# Patient Record
Sex: MALE | Race: WHITE | Employment: UNEMPLOYED | URBAN - METROPOLITAN AREA
[De-identification: names, ages, dates, MRNs, and addresses within clinical notes are randomized per-mention and may not be internally consistent; named-entity substitution may affect disease eponyms.]

---

## 2019-04-10 ENCOUNTER — HOSPITAL ENCOUNTER (EMERGENCY)
Age: 34
Discharge: HOME | End: 2019-04-10
Attending: EMERGENCY MEDICINE
Payer: COMMERCIAL

## 2019-04-10 VITALS
WEIGHT: 130 LBS | OXYGEN SATURATION: 98 % | RESPIRATION RATE: 13 BRPM | BODY MASS INDEX: 20.89 KG/M2 | TEMPERATURE: 98.1 F | HEART RATE: 70 BPM | SYSTOLIC BLOOD PRESSURE: 131 MMHG | DIASTOLIC BLOOD PRESSURE: 87 MMHG | HEIGHT: 66 IN

## 2019-04-10 DIAGNOSIS — S61.412A LACERATION OF LEFT HAND WITHOUT FOREIGN BODY, INITIAL ENCOUNTER: Primary | ICD-10-CM

## 2019-04-10 PROCEDURE — 99282 EMERGENCY DEPT VISIT SF MDM: CPT

## 2019-04-10 PROCEDURE — 12002 RPR S/N/AX/GEN/TRNK2.6-7.5CM: CPT

## 2019-04-10 PROCEDURE — 2500000003 HC RX 250 WO HCPCS: Performed by: NURSE PRACTITIONER

## 2019-04-10 RX ORDER — CEPHALEXIN 500 MG/1
500 CAPSULE ORAL 4 TIMES DAILY
Qty: 28 CAPSULE | Refills: 0 | Status: SHIPPED | OUTPATIENT
Start: 2019-04-10 | End: 2019-04-17

## 2019-04-10 RX ORDER — LIDOCAINE HYDROCHLORIDE 10 MG/ML
20 INJECTION, SOLUTION INFILTRATION; PERINEURAL ONCE
Status: COMPLETED | OUTPATIENT
Start: 2019-04-10 | End: 2019-04-10

## 2019-04-10 RX ORDER — IBUPROFEN 800 MG/1
800 TABLET ORAL EVERY 8 HOURS PRN
Qty: 20 TABLET | Refills: 0 | Status: SHIPPED | OUTPATIENT
Start: 2019-04-10

## 2019-04-10 RX ADMIN — LIDOCAINE HYDROCHLORIDE 20 ML: 10 INJECTION, SOLUTION INFILTRATION; PERINEURAL at 11:45

## 2019-04-10 ASSESSMENT — ENCOUNTER SYMPTOMS
VOMITING: 0
SHORTNESS OF BREATH: 0
TROUBLE SWALLOWING: 0
NAUSEA: 0
COUGH: 0

## 2019-04-10 ASSESSMENT — PAIN DESCRIPTION - DESCRIPTORS: DESCRIPTORS: ACHING

## 2019-04-10 ASSESSMENT — PAIN SCALES - GENERAL
PAINLEVEL_OUTOF10: 9
PAINLEVEL_OUTOF10: 9

## 2019-04-10 ASSESSMENT — PAIN DESCRIPTION - FREQUENCY: FREQUENCY: CONTINUOUS

## 2019-04-10 ASSESSMENT — PAIN DESCRIPTION - LOCATION: LOCATION: HAND

## 2019-04-10 ASSESSMENT — PAIN DESCRIPTION - PAIN TYPE: TYPE: ACUTE PAIN

## 2019-04-10 ASSESSMENT — PAIN DESCRIPTION - ORIENTATION: ORIENTATION: LEFT

## 2019-04-10 NOTE — ED PROVIDER NOTES
eMERGENCY dEPARTMENT eNCOUnter   Independent Attestation     Pt Name: Sasha Shaw  MRN: [id number]  [place] 1985  Date of evaluation: 4/10/19     Daryle Stefanie Bread is a 34 y.o. male with CC: Laceration (left hand)      Based on the medical record the care appears appropriate. I was personally available for consultation in the Emergency Department.     Chaz Powell MD  Attending Emergency Physician                    Rhoda Mason MD  04/10/19 0274

## 2019-04-10 NOTE — ED PROVIDER NOTES
[address] ED  eMERGENCY dEPARTMENT eNCOUnter      Pt Name: Jose Enrique Villarreal  MRN: [id number]  [place] 1985  Date of evaluation: 4/10/2019  Provider: NASIM Briceno CNP    CHIEF COMPLAINT       Chief Complaint   Patient presents with    Laceration     left hand         HISTORY OF PRESENT ILLNESS  (Location/Symptom, Timing/Onset, Context/Setting, Quality, Duration,Modifying Factors, Severity.)   Daryle Lilli Coddington is a 34 y.o. male who presents to the emergency department for evaluation of laceration:     Location/Symptom:Laceration to left hand   Timing/Onset: prior to arrival  Context/Setting: Patient was cutting off zip tie from the car with razor blade and cut his hand. No focal weakness/paresthesias. Bleeding controlled with pressure. Right hand dominant. Tetanus UTD?   up to date. Quality:sharp, achy  Duration: constant  ModifyingFactors: worse with movement  Severity: moderate    Nursing Notes were reviewed and I agree. REVIEW OF SYSTEMS    (2-9systems for level 4, 10 or more for level 5)     Review of Systems   Constitutional: Negative for chills and fever. HENT: Negative for trouble swallowing. Respiratory: Negative for cough and shortness of breath. Cardiovascular: Negative for chest pain and palpitations. Gastrointestinal: Negative for nausea and vomiting. Skin: Positive for wound. Except as noted above the remainder of the review of systems wasreviewed and negative. PAST MEDICAL HISTORY   History reviewed. No pertinent past medical history. Reviewed. SURGICALHISTORY     History reviewed. No pertinent surgical history. Reviewed. CURRENT MEDICATIONS       Discharge Medication List as of 4/10/2019 12:16 PM          ALLERGIES     Patient has no known allergies. FAMILY HISTORY     History reviewed. No pertinent family history. No family status information on file. Reviewed and not relevant. SOCIAL HISTORY      has an unknown smoking status.  He has never used smokeless tobacco. He reports that he drinks alcohol. He reports that he has current or past drug history. Reviewed. PHYSICAL EXAM    (up to 7 for level 4, 8 or more for level 5)     ED Triage Vitals [04/10/19 1119]   BP Temp Temp src Pulse Resp SpO2 Height Weight   131/87 98.1 °F (36.7 °C) -- 70 13 98 % 5' 6\" (1.676 m) 130 lb (59 kg)       Physical Exam   Constitutional: He is oriented to person, place, and time. He appears well-developed and well-nourished. No distress. HENT:   Head: Normocephalic and atraumatic. Right Ear: External ear normal.   Left Ear: External ear normal.   Nose: Nose normal.   Eyes: Right eye exhibits no discharge. Left eye exhibits no discharge. No scleral icterus. Neck: Normal range of motion. No tracheal deviation present. Pulmonary/Chest: Effort normal. No stridor. No respiratory distress. Musculoskeletal: Normal range of motion. He exhibits no edema. Hands:  FROM of left thumb/index finger. Full strength for flexion and extension against full resistance at the MCP, PIP, and DIP in the affected hand. Intact to radial, median, and ulnar nerves for motor and sensation in the affected finger. Neurological: He is alert and oriented to person, place, and time. Coordination normal.   Skin: Skin is warm and dry. He is not diaphoretic. Psychiatric: He has a normal mood and affect. His behavior is normal.   ]    DIAGNOSTIC RESULTS     RADIOLOGY:   none      LABS:  Labs Reviewed - No data to display    All other labs were within normal range or not returned as of this dictation. EMERGENCY DEPARTMENT COURSE and DIFFERENTIAL DIAGNOSIS/MDM:   Patient to ED forevaluation of laceration. Repaired with sutures. Ok to discharge home. Wound instructions given. Sutures out in 10 days. Follow up with PCP in 1-2 days for a recheck.  Return to ED if worsening pain, bleeding, signs ofinfection of other emergent symptoms    Vitals:    Vitals:    04/10/19 1119   BP: 131/87   Pulse: 70   Resp: 13   Temp: 98.1 °F (36.7 °C)   SpO2: 98%   Weight: 130 lb (59 kg)   Height: 5' 6\" (1.676 m)         Vitals reviewed. PROCEDURES:  Lac Repair  Date/Time: 4/10/2019 1:42 PM  Performed by: NASIM Machuca CNP  Authorized by: Selena Roper MD     Consent:     Consent obtained:  Verbal    Consent given by:  Patient    Risks discussed:  Infection, pain and poor wound healing    Alternatives discussed:  No treatment  Anesthesia (see MAR for exact dosages): Anesthesia method:  Local infiltration    Local anesthetic:  Lidocaine 1% w/o epi  Laceration details:     Location:  Hand    Hand location:  L hand, dorsum    Length (cm):  3  Repair type:     Repair type:  Simple  Pre-procedure details:     Preparation:  Patient was prepped and draped in usual sterile fashion  Exploration:     Wound exploration: wound explored through full range of motion and entire depth of wound probed and visualized    Treatment:     Area cleansed with:  Betadine and saline    Amount of cleaning:  Standard    Irrigation solution:  Sterile saline    Irrigation method:  Pressure wash  Skin repair:     Repair method:  Sutures    Suture size:  4-0    Suture material:  Nylon    Suture technique:  Simple interrupted    Number of sutures:  6  Approximation:     Approximation:  Close  Post-procedure details:     Dressing:  Antibiotic ointment    Patient tolerance of procedure: Tolerated well, no immediate complications        FINAL IMPRESSION      1.  Laceration of left hand without foreign body, initial encounter          DISPOSITION/PLAN   DISPOSITION Decision To Discharge 04/10/2019 12:14:51 PM      PATIENT REFERRED TO:  [facility]  [address] [address] Neto Mancini  [phone]  Schedule an appointment as soon as possible for a visit in 10 days  For suture removal    [place] [address]  [phone]    If symptoms worsen      DISCHARGE MEDICATIONS:  Discharge Medication List as of 4/10/2019 12:16 PM      START taking these medications    Details   cephALEXin (KEFLEX) 500 MG capsule Take 1 capsule by mouth 4 times daily for 7 days, Disp-28 capsule, R-0Print      ibuprofen (ADVIL;MOTRIN) 800 MG tablet Take 1 tablet by mouth every 8 hours as needed for Pain, Disp-20 tablet, R-0Print             (Please note that portions of this note were completed with a voice recognition program.  Efforts were made to edit the dictations but occasionally wordsare mis-transcribed.)    Calista Brice, NASIM - NASIM Gama - CARLOS  04/10/19 1408